# Patient Record
Sex: MALE | Race: WHITE | NOT HISPANIC OR LATINO | Employment: OTHER | URBAN - METROPOLITAN AREA
[De-identification: names, ages, dates, MRNs, and addresses within clinical notes are randomized per-mention and may not be internally consistent; named-entity substitution may affect disease eponyms.]

---

## 2024-09-28 ENCOUNTER — APPOINTMENT (OUTPATIENT)
Dept: RADIOLOGY | Facility: MEDICAL CENTER | Age: 69
End: 2024-09-28
Attending: EMERGENCY MEDICINE
Payer: MEDICARE

## 2024-09-28 ENCOUNTER — HOSPITAL ENCOUNTER (OUTPATIENT)
Facility: MEDICAL CENTER | Age: 69
End: 2024-09-29
Attending: EMERGENCY MEDICINE | Admitting: INTERNAL MEDICINE
Payer: MEDICARE

## 2024-09-28 DIAGNOSIS — R07.9 CHEST PAIN, UNSPECIFIED TYPE: ICD-10-CM

## 2024-09-28 DIAGNOSIS — I27.20 PULMONARY HYPERTENSION (HCC): ICD-10-CM

## 2024-09-28 DIAGNOSIS — I10 ESSENTIAL HYPERTENSION: ICD-10-CM

## 2024-09-28 DIAGNOSIS — E03.9 HYPOTHYROIDISM, UNSPECIFIED TYPE: ICD-10-CM

## 2024-09-28 DIAGNOSIS — R06.00 DYSPNEA, UNSPECIFIED TYPE: ICD-10-CM

## 2024-09-28 PROBLEM — F12.90 MARIJUANA USE: Status: ACTIVE | Noted: 2024-09-28

## 2024-09-28 PROBLEM — F15.10 METHAMPHETAMINE USE (HCC): Status: ACTIVE | Noted: 2024-09-28

## 2024-09-28 PROBLEM — R06.09 EXERTIONAL DYSPNEA: Status: ACTIVE | Noted: 2024-09-28

## 2024-09-28 LAB
ALBUMIN SERPL BCP-MCNC: 3.9 G/DL (ref 3.2–4.9)
ALBUMIN/GLOB SERPL: 1.2 G/DL
ALP SERPL-CCNC: 103 U/L (ref 30–99)
ALT SERPL-CCNC: 36 U/L (ref 2–50)
AMPHET UR QL SCN: POSITIVE
ANION GAP SERPL CALC-SCNC: 11 MMOL/L (ref 7–16)
APPEARANCE UR: CLEAR
AST SERPL-CCNC: 22 U/L (ref 12–45)
BARBITURATES UR QL SCN: NEGATIVE
BASOPHILS # BLD AUTO: 0.4 % (ref 0–1.8)
BASOPHILS # BLD: 0.04 K/UL (ref 0–0.12)
BENZODIAZ UR QL SCN: NEGATIVE
BILIRUB SERPL-MCNC: 0.4 MG/DL (ref 0.1–1.5)
BILIRUB UR QL STRIP.AUTO: NEGATIVE
BUN SERPL-MCNC: 18 MG/DL (ref 8–22)
BZE UR QL SCN: NEGATIVE
CALCIUM ALBUM COR SERPL-MCNC: 10.2 MG/DL (ref 8.5–10.5)
CALCIUM SERPL-MCNC: 10.1 MG/DL (ref 8.5–10.5)
CANNABINOIDS UR QL SCN: NEGATIVE
CHLORIDE SERPL-SCNC: 105 MMOL/L (ref 96–112)
CO2 SERPL-SCNC: 24 MMOL/L (ref 20–33)
COLOR UR: YELLOW
CREAT SERPL-MCNC: 1.39 MG/DL (ref 0.5–1.4)
EKG IMPRESSION: NORMAL
EOSINOPHIL # BLD AUTO: 0.12 K/UL (ref 0–0.51)
EOSINOPHIL NFR BLD: 1.2 % (ref 0–6.9)
ERYTHROCYTE [DISTWIDTH] IN BLOOD BY AUTOMATED COUNT: 45.5 FL (ref 35.9–50)
FENTANYL UR QL: NEGATIVE
GFR SERPLBLD CREATININE-BSD FMLA CKD-EPI: 55 ML/MIN/1.73 M 2
GLOBULIN SER CALC-MCNC: 3.3 G/DL (ref 1.9–3.5)
GLUCOSE SERPL-MCNC: 90 MG/DL (ref 65–99)
GLUCOSE UR STRIP.AUTO-MCNC: NEGATIVE MG/DL
HCT VFR BLD AUTO: 56.9 % (ref 42–52)
HGB BLD-MCNC: 20.1 G/DL (ref 14–18)
IMM GRANULOCYTES # BLD AUTO: 0.04 K/UL (ref 0–0.11)
IMM GRANULOCYTES NFR BLD AUTO: 0.4 % (ref 0–0.9)
KETONES UR STRIP.AUTO-MCNC: NEGATIVE MG/DL
LEUKOCYTE ESTERASE UR QL STRIP.AUTO: NEGATIVE
LYMPHOCYTES # BLD AUTO: 2.04 K/UL (ref 1–4.8)
LYMPHOCYTES NFR BLD: 20.2 % (ref 22–41)
MCH RBC QN AUTO: 34 PG (ref 27–33)
MCHC RBC AUTO-ENTMCNC: 35.3 G/DL (ref 32.3–36.5)
MCV RBC AUTO: 96.1 FL (ref 81.4–97.8)
METHADONE UR QL SCN: NEGATIVE
MICRO URNS: NORMAL
MONOCYTES # BLD AUTO: 0.96 K/UL (ref 0–0.85)
MONOCYTES NFR BLD AUTO: 9.5 % (ref 0–13.4)
NEUTROPHILS # BLD AUTO: 6.89 K/UL (ref 1.82–7.42)
NEUTROPHILS NFR BLD: 68.3 % (ref 44–72)
NITRITE UR QL STRIP.AUTO: NEGATIVE
NRBC # BLD AUTO: 0 K/UL
NRBC BLD-RTO: 0 /100 WBC (ref 0–0.2)
NT-PROBNP SERPL IA-MCNC: 459 PG/ML (ref 0–125)
OPIATES UR QL SCN: NEGATIVE
OXYCODONE UR QL SCN: NEGATIVE
PCP UR QL SCN: NEGATIVE
PH UR STRIP.AUTO: 7.5 [PH] (ref 5–8)
PLATELET # BLD AUTO: 246 K/UL (ref 164–446)
PMV BLD AUTO: 9.2 FL (ref 9–12.9)
POTASSIUM SERPL-SCNC: 4.9 MMOL/L (ref 3.6–5.5)
PROPOXYPH UR QL SCN: NEGATIVE
PROT SERPL-MCNC: 7.2 G/DL (ref 6–8.2)
PROT UR QL STRIP: NEGATIVE MG/DL
RBC # BLD AUTO: 5.92 M/UL (ref 4.7–6.1)
RBC UR QL AUTO: NEGATIVE
SODIUM SERPL-SCNC: 140 MMOL/L (ref 135–145)
SP GR UR STRIP.AUTO: 1.02
TROPONIN T SERPL-MCNC: 10 NG/L (ref 6–19)
TROPONIN T SERPL-MCNC: 12 NG/L (ref 6–19)
UROBILINOGEN UR STRIP.AUTO-MCNC: 0.2 MG/DL
WBC # BLD AUTO: 10.1 K/UL (ref 4.8–10.8)

## 2024-09-28 PROCEDURE — 99223 1ST HOSP IP/OBS HIGH 75: CPT | Performed by: INTERNAL MEDICINE

## 2024-09-28 PROCEDURE — G0378 HOSPITAL OBSERVATION PER HR: HCPCS

## 2024-09-28 PROCEDURE — 80053 COMPREHEN METABOLIC PANEL: CPT

## 2024-09-28 PROCEDURE — 700111 HCHG RX REV CODE 636 W/ 250 OVERRIDE (IP): Performed by: NURSE PRACTITIONER

## 2024-09-28 PROCEDURE — 96372 THER/PROPH/DIAG INJ SC/IM: CPT

## 2024-09-28 PROCEDURE — 71045 X-RAY EXAM CHEST 1 VIEW: CPT

## 2024-09-28 PROCEDURE — 85025 COMPLETE CBC W/AUTO DIFF WBC: CPT

## 2024-09-28 PROCEDURE — 83880 ASSAY OF NATRIURETIC PEPTIDE: CPT

## 2024-09-28 PROCEDURE — 80307 DRUG TEST PRSMV CHEM ANLYZR: CPT

## 2024-09-28 PROCEDURE — 93005 ELECTROCARDIOGRAM TRACING: CPT

## 2024-09-28 PROCEDURE — 93005 ELECTROCARDIOGRAM TRACING: CPT | Performed by: EMERGENCY MEDICINE

## 2024-09-28 PROCEDURE — 81003 URINALYSIS AUTO W/O SCOPE: CPT

## 2024-09-28 PROCEDURE — A9270 NON-COVERED ITEM OR SERVICE: HCPCS | Performed by: EMERGENCY MEDICINE

## 2024-09-28 PROCEDURE — 700102 HCHG RX REV CODE 250 W/ 637 OVERRIDE(OP): Performed by: EMERGENCY MEDICINE

## 2024-09-28 PROCEDURE — 36415 COLL VENOUS BLD VENIPUNCTURE: CPT

## 2024-09-28 PROCEDURE — 99285 EMERGENCY DEPT VISIT HI MDM: CPT

## 2024-09-28 PROCEDURE — 84484 ASSAY OF TROPONIN QUANT: CPT

## 2024-09-28 RX ORDER — ASPIRIN 81 MG/1
81 TABLET ORAL DAILY
Status: DISCONTINUED | OUTPATIENT
Start: 2024-09-29 | End: 2024-09-29 | Stop reason: HOSPADM

## 2024-09-28 RX ORDER — HYDRALAZINE HYDROCHLORIDE 20 MG/ML
10 INJECTION INTRAMUSCULAR; INTRAVENOUS EVERY 6 HOURS PRN
Status: DISCONTINUED | OUTPATIENT
Start: 2024-09-28 | End: 2024-09-29 | Stop reason: HOSPADM

## 2024-09-28 RX ORDER — REGADENOSON 0.08 MG/ML
0.4 INJECTION, SOLUTION INTRAVENOUS
Status: DISCONTINUED | OUTPATIENT
Start: 2024-09-28 | End: 2024-09-29 | Stop reason: HOSPADM

## 2024-09-28 RX ORDER — ASPIRIN 81 MG/1
324 TABLET, CHEWABLE ORAL ONCE
Status: COMPLETED | OUTPATIENT
Start: 2024-09-28 | End: 2024-09-28

## 2024-09-28 RX ORDER — ASPIRIN 300 MG/1
300 SUPPOSITORY RECTAL ONCE
Status: COMPLETED | OUTPATIENT
Start: 2024-09-28 | End: 2024-09-28

## 2024-09-28 RX ORDER — HEPARIN SODIUM 5000 [USP'U]/ML
5000 INJECTION, SOLUTION INTRAVENOUS; SUBCUTANEOUS EVERY 8 HOURS
Status: DISCONTINUED | OUTPATIENT
Start: 2024-09-28 | End: 2024-09-29 | Stop reason: HOSPADM

## 2024-09-28 RX ORDER — AMINOPHYLLINE 25 MG/ML
100 INJECTION, SOLUTION INTRAVENOUS
Status: DISCONTINUED | OUTPATIENT
Start: 2024-09-28 | End: 2024-09-29 | Stop reason: HOSPADM

## 2024-09-28 RX ORDER — MORPHINE SULFATE 4 MG/ML
2 INJECTION INTRAVENOUS
Status: DISCONTINUED | OUTPATIENT
Start: 2024-09-28 | End: 2024-09-29 | Stop reason: HOSPADM

## 2024-09-28 RX ADMIN — ASPIRIN 324 MG: 81 TABLET, CHEWABLE ORAL at 15:07

## 2024-09-28 RX ADMIN — HEPARIN SODIUM 5000 UNITS: 5000 INJECTION, SOLUTION INTRAVENOUS; SUBCUTANEOUS at 17:32

## 2024-09-28 SDOH — ECONOMIC STABILITY: TRANSPORTATION INSECURITY
IN THE PAST 12 MONTHS, HAS LACK OF RELIABLE TRANSPORTATION KEPT YOU FROM MEDICAL APPOINTMENTS, MEETINGS, WORK OR FROM GETTING THINGS NEEDED FOR DAILY LIVING?: NO

## 2024-09-28 SDOH — ECONOMIC STABILITY: TRANSPORTATION INSECURITY
IN THE PAST 12 MONTHS, HAS THE LACK OF TRANSPORTATION KEPT YOU FROM MEDICAL APPOINTMENTS OR FROM GETTING MEDICATIONS?: NO

## 2024-09-28 ASSESSMENT — ENCOUNTER SYMPTOMS
DIAPHORESIS: 0
SORE THROAT: 0
DEPRESSION: 0
DIZZINESS: 0
SHORTNESS OF BREATH: 1
BACK PAIN: 0
ORTHOPNEA: 0
CONSTIPATION: 0
SPUTUM PRODUCTION: 0
FEVER: 0
DIARRHEA: 0
FLANK PAIN: 0
NERVOUS/ANXIOUS: 0
ABDOMINAL PAIN: 0
COUGH: 0
PALPITATIONS: 0
SINUS PAIN: 0
HEADACHES: 0
MYALGIAS: 0
WEIGHT LOSS: 0
NAUSEA: 0
WHEEZING: 0
CHILLS: 0
HEARTBURN: 0
VOMITING: 0

## 2024-09-28 ASSESSMENT — LIFESTYLE VARIABLES
TOTAL SCORE: 0
CONSUMPTION TOTAL: NEGATIVE
ALCOHOL_USE: YES
AVERAGE NUMBER OF DAYS PER WEEK YOU HAVE A DRINK CONTAINING ALCOHOL: 3
EVER FELT BAD OR GUILTY ABOUT YOUR DRINKING: NO
HAVE YOU EVER FELT YOU SHOULD CUT DOWN ON YOUR DRINKING: NO
HAVE PEOPLE ANNOYED YOU BY CRITICIZING YOUR DRINKING: NO
TOTAL SCORE: 0
TOTAL SCORE: 0
DOES PATIENT WANT TO STOP DRINKING: NO
EVER HAD A DRINK FIRST THING IN THE MORNING TO STEADY YOUR NERVES TO GET RID OF A HANGOVER: NO
HOW MANY TIMES IN THE PAST YEAR HAVE YOU HAD 5 OR MORE DRINKS IN A DAY: 0
ON A TYPICAL DAY WHEN YOU DRINK ALCOHOL HOW MANY DRINKS DO YOU HAVE: 2

## 2024-09-28 ASSESSMENT — COGNITIVE AND FUNCTIONAL STATUS - GENERAL
SUGGESTED CMS G CODE MODIFIER MOBILITY: CH
DAILY ACTIVITIY SCORE: 24
MOBILITY SCORE: 24
SUGGESTED CMS G CODE MODIFIER DAILY ACTIVITY: CH

## 2024-09-28 ASSESSMENT — PATIENT HEALTH QUESTIONNAIRE - PHQ9
4. FEELING TIRED OR HAVING LITTLE ENERGY: SEVERAL DAYS
2. FEELING DOWN, DEPRESSED, IRRITABLE, OR HOPELESS: MORE THAN HALF THE DAYS
6. FEELING BAD ABOUT YOURSELF - OR THAT YOU ARE A FAILURE OR HAVE LET YOURSELF OR YOUR FAMILY DOWN: SEVERAL DAYS
SUM OF ALL RESPONSES TO PHQ QUESTIONS 1-9: 10
3. TROUBLE FALLING OR STAYING ASLEEP OR SLEEPING TOO MUCH: SEVERAL DAYS
9. THOUGHTS THAT YOU WOULD BE BETTER OFF DEAD, OR OF HURTING YOURSELF: NOT AT ALL
8. MOVING OR SPEAKING SO SLOWLY THAT OTHER PEOPLE COULD HAVE NOTICED. OR THE OPPOSITE, BEING SO FIGETY OR RESTLESS THAT YOU HAVE BEEN MOVING AROUND A LOT MORE THAN USUAL: NOT AT ALL
1. LITTLE INTEREST OR PLEASURE IN DOING THINGS: MORE THAN HALF THE DAYS
7. TROUBLE CONCENTRATING ON THINGS, SUCH AS READING THE NEWSPAPER OR WATCHING TELEVISION: SEVERAL DAYS
SUM OF ALL RESPONSES TO PHQ9 QUESTIONS 1 AND 2: 4
5. POOR APPETITE OR OVEREATING: MORE THAN HALF THE DAYS

## 2024-09-28 ASSESSMENT — SOCIAL DETERMINANTS OF HEALTH (SDOH)
WITHIN THE LAST YEAR, HAVE YOU BEEN HUMILIATED OR EMOTIONALLY ABUSED IN OTHER WAYS BY YOUR PARTNER OR EX-PARTNER?: NO
WITHIN THE PAST 12 MONTHS, THE FOOD YOU BOUGHT JUST DIDN'T LAST AND YOU DIDN'T HAVE MONEY TO GET MORE: NEVER TRUE
IN THE PAST 12 MONTHS, HAS THE ELECTRIC, GAS, OIL, OR WATER COMPANY THREATENED TO SHUT OFF SERVICE IN YOUR HOME?: NO
WITHIN THE LAST YEAR, HAVE YOU BEEN KICKED, HIT, SLAPPED, OR OTHERWISE PHYSICALLY HURT BY YOUR PARTNER OR EX-PARTNER?: NO
WITHIN THE PAST 12 MONTHS, YOU WORRIED THAT YOUR FOOD WOULD RUN OUT BEFORE YOU GOT THE MONEY TO BUY MORE: NEVER TRUE
WITHIN THE LAST YEAR, HAVE TO BEEN RAPED OR FORCED TO HAVE ANY KIND OF SEXUAL ACTIVITY BY YOUR PARTNER OR EX-PARTNER?: NO
WITHIN THE LAST YEAR, HAVE YOU BEEN AFRAID OF YOUR PARTNER OR EX-PARTNER?: NO

## 2024-09-28 ASSESSMENT — HEART SCORE
RISK FACTORS: 1-2 RISK FACTORS
HISTORY: MODERATELY SUSPICIOUS
ECG: NORMAL
AGE: 65+
HEART SCORE: 4
TROPONIN: LESS THAN OR EQUAL TO NORMAL LIMIT

## 2024-09-28 ASSESSMENT — FIBROSIS 4 INDEX
FIB4 SCORE: 1
FIB4 SCORE: 1

## 2024-09-28 ASSESSMENT — PAIN DESCRIPTION - PAIN TYPE: TYPE: ACUTE PAIN

## 2024-09-28 NOTE — ASSESSMENT & PLAN NOTE
EKG without acute ST-T changes. Troponin normal. BNP mildly elevated. CXR negative  -Trend cardiac enzymes  -Stress test in AM if enzymes are negative  -Consult cardiology if enzymes rise or if EKG changes   -Echocardiogram  -Check A1C, TSH and lipid panel  -ASA 81  -IV morphine PRN ongoing chest pain

## 2024-09-28 NOTE — ED NOTES
Med rec updated and complete. Allergies reviewed.     Pt confirmed name and date of birth.      Pt denies taking medications.   No outpatient antibiotic use in last 30 days.  No anticoagulant or antiplatelet medications.        Pt would like to use  [facility] [phone]

## 2024-09-28 NOTE — ED NOTES
PIV established. Repeat troponin drawn and sent to lab.  Pt updated on POC. Pt verbalized understanding.

## 2024-09-28 NOTE — ED PROVIDER NOTES
"ED Provider Note    CHIEF COMPLAINT  Chief Complaint   Patient presents with    Chest Pain     Patient substernal chest pain that has been happening intermittently since he quit smoking in February. Patient reports he notices the pain mostly on exertion. Patient endorses intermittent SOB as well    Shortness of Breath     Patient reports \"I feel like my lungs cleared up when I stopped smoking, but I get winded much faster\"        HPI/ROS    Clarence Taveras is a 69 y.o. male who presents with shortness of breath.  The patient states that he quit smoking in February of last year.  He states since that time he has had exertional dyspnea as well as chest pressure.  He does have some associated diaphoresis but no nausea.  He states that his chest pain seems to go away at rest.  He does not have any pain or swelling to his lower extremities.  He has not seen a medical provider in many years and is unaware of any medical problems.  Not had any recent fevers nor cough.    PAST MEDICAL HISTORY       SURGICAL HISTORY  patient denies any surgical history    FAMILY HISTORY  History reviewed. No pertinent family history.    SOCIAL HISTORY  Social History     Tobacco Use    Smoking status: Former     Current packs/day: 0.00     Types: Cigarettes     Quit date: 2024     Years since quittin.6    Smokeless tobacco: Never   Substance and Sexual Activity    Alcohol use: Not Currently    Drug use: Never    Sexual activity: Not on file       CURRENT MEDICATIONS  Home Medications       Reviewed by Coretta Davies R.N. (Registered Nurse) on 24 at 1253  Med List Status: Partial     Medication Last Dose Status        Patient Sammy Taking any Medications                         Audit from Redirected Encounters    **Home medications have not yet been reviewed for this encounter**         ALLERGIES  No Known Allergies    PHYSICAL EXAM  VITAL SIGNS: BP (!) 146/97   Pulse 96   Temp 36.9 °C (98.4 °F) (Temporal)   Resp 18   Ht " "1.727 m (5' 8\")   Wt 82.1 kg (181 lb)   SpO2 95%   BMI 27.52 kg/m²    In general the patient does not appear toxic    HEENT unremarkable    Pulmonary the patient's lungs are symmetrically diminished throughout.  There is no wheezing, rhonchi, no rales    Cardiovascular S1-S2 with a regular rate and rhythm    GI abdomen is soft    Skin no rashes, pallor, no jaundice    Extremities no distal edema    Neurologic examination is grossly intact    EKG/LABS  Results for orders placed or performed during the hospital encounter of 09/28/24   CBC with Differential   Result Value Ref Range    WBC 10.1 4.8 - 10.8 K/uL    RBC 5.92 4.70 - 6.10 M/uL    Hemoglobin 20.1 (H) 14.0 - 18.0 g/dL    Hematocrit 56.9 (H) 42.0 - 52.0 %    MCV 96.1 81.4 - 97.8 fL    MCH 34.0 (H) 27.0 - 33.0 pg    MCHC 35.3 32.3 - 36.5 g/dL    RDW 45.5 35.9 - 50.0 fL    Platelet Count 246 164 - 446 K/uL    MPV 9.2 9.0 - 12.9 fL    Neutrophils-Polys 68.30 44.00 - 72.00 %    Lymphocytes 20.20 (L) 22.00 - 41.00 %    Monocytes 9.50 0.00 - 13.40 %    Eosinophils 1.20 0.00 - 6.90 %    Basophils 0.40 0.00 - 1.80 %    Immature Granulocytes 0.40 0.00 - 0.90 %    Nucleated RBC 0.00 0.00 - 0.20 /100 WBC    Neutrophils (Absolute) 6.89 1.82 - 7.42 K/uL    Lymphs (Absolute) 2.04 1.00 - 4.80 K/uL    Monos (Absolute) 0.96 (H) 0.00 - 0.85 K/uL    Eos (Absolute) 0.12 0.00 - 0.51 K/uL    Baso (Absolute) 0.04 0.00 - 0.12 K/uL    Immature Granulocytes (abs) 0.04 0.00 - 0.11 K/uL    NRBC (Absolute) 0.00 K/uL   Complete Metabolic Panel (CMP)   Result Value Ref Range    Sodium 140 135 - 145 mmol/L    Potassium 4.9 3.6 - 5.5 mmol/L    Chloride 105 96 - 112 mmol/L    Co2 24 20 - 33 mmol/L    Anion Gap 11.0 7.0 - 16.0    Glucose 90 65 - 99 mg/dL    Bun 18 8 - 22 mg/dL    Creatinine 1.39 0.50 - 1.40 mg/dL    Calcium 10.1 8.5 - 10.5 mg/dL    Correct Calcium 10.2 8.5 - 10.5 mg/dL    AST(SGOT) 22 12 - 45 U/L    ALT(SGPT) 36 2 - 50 U/L    Alkaline Phosphatase 103 (H) 30 - 99 U/L    Total " Bilirubin 0.4 0.1 - 1.5 mg/dL    Albumin 3.9 3.2 - 4.9 g/dL    Total Protein 7.2 6.0 - 8.2 g/dL    Globulin 3.3 1.9 - 3.5 g/dL    A-G Ratio 1.2 g/dL   proBrain Natriuretic Peptide, NT (BNP)   Result Value Ref Range    NT-proBNP 459 (H) 0 - 125 pg/mL   Troponins NOW   Result Value Ref Range    Troponin T 12 6 - 19 ng/L   ESTIMATED GFR   Result Value Ref Range    GFR (CKD-EPI) 55 (A) >60 mL/min/1.73 m 2   EKG (NOW)   Result Value Ref Range    Report       [facility] Emergency [facility]    Test Date:  2024  Pt Name:    MYRON ZUNIGA                  Department: ER  MRN:        [id number]                      Room:  Gender:     Male                         Technician: [id number]  :        [id number]                   Requested By:ER TRIAGE PROTOCOL  Order #:    [id number]                    Reading MD: LETTY ZAMORA MD    Measurements  Intervals                                Axis  Rate:       109                          P:          68  MA:         138                          QRS:        90  QRSD:       79                           T:          56  QT:         318  QTc:        429    Interpretive Statements  Twelve-lead EKG shows a sinus tachycardia with a ventricular rate of 109, the  patient does have a premature ventricular contraction, no ST segment  elevation or depression, T wave inverted in V1.  There is no old EKG to  compare  Electronically Signed On 2024 14:51:23 PDT by LETTY ZAMORA MD         I have independently interpreted this EKG    RADIOLOGY/PROCEDURES   I have independently interpreted the diagnostic imaging associated with this visit and am waiting the final reading from the radiologist.   My preliminary interpretation is as follows: Chest x-ray is reviewed and shows no evidence of a focal process such as pneumonia    Radiologist interpretation:  DX-CHEST-PORTABLE (1 VIEW)   Final Result         No acute cardiac or pulmonary abnormality is identified.        HEART Score:  4    COURSE & MEDICAL DECISION MAKING    This is 69-year-old male who presents to the Emergency Department with chest discomfort.  Patient's history is concerning for unstable angina with the exertional component.  He is pain-free at this time.  He will receive aspirin.  His BNP is slightly elevated but clinical does not have any evidence of heart failure.  He could also have COPD from his prior smoking as his lungs are slightly diminished.  Due to the inability to rule out a cardiac source admit the patient to the CDU overnight for stress test.  Otherwise the patient is resting comfortably at the time of admission.    FINAL DIAGNOSIS  1.  Chest pain  2.  Dyspnea    Disposition  The patient will be admitted in stable condition     Electronically signed by: Med Gong M.D., 9/28/2024 2:51 PM

## 2024-09-28 NOTE — ED NOTES
Pt ambulatory from lobby to ED room with steady gait.  Pt changed into gown and connected to monitor.  Call light within reach.

## 2024-09-28 NOTE — ED TRIAGE NOTES
"Chief Complaint   Patient presents with    Chest Pain     Patient substernal chest pain that has been happening intermittently since he quit smoking in February. Patient reports he notices the pain mostly on exertion. Patient endorses intermittent SOB as well    Shortness of Breath     Patient reports \"I feel like my lungs cleared up when I stopped smoking, but I get winded much faster\"        70 yo male to triage for above complaint.   EKG complete. Protocol ordered.     Pt is alert and oriented, speaking in full sentences, follows commands and responds appropriately to questions.     Patient placed back in lobby and educated on triage process. Asked to inform RN of any changes.    /80   Pulse (!) 115   Temp 36.9 °C (98.4 °F) (Temporal)   Resp 18   Ht 1.727 m (5' 8\")   Wt 82.1 kg (181 lb)   SpO2 98%   BMI 27.52 kg/m²     "

## 2024-09-28 NOTE — ED NOTES
Report called to receiving tele 8 RN.  Per RN, room is not cleaned yet. Tele to come down to  pt once room is clean.

## 2024-09-28 NOTE — H&P
"Hospital Medicine History & Physical Note    Date of Service  9/28/2024    Primary Care Physician  No primary care provider on file.      Code Status  Full Code    Chief Complaint  Chief Complaint   Patient presents with    Chest Pain     Patient substernal chest pain that has been happening intermittently since he quit smoking in February. Patient reports he notices the pain mostly on exertion. Patient endorses intermittent SOB as well    Shortness of Breath     Patient reports \"I feel like my lungs cleared up when I stopped smoking, but I get winded much faster\"        History of Presenting Illness  Clarence Taveras is a 69 y.o. male who presented to the ED on 9/28/2024 with complaints of exertional shortness of breath. The patient tells me that he stopped smoking in February, 2024. He says he smoked a pack cigarettes daily for many years. Since he quit smoking, he has had shortness of breath mainly when he bends over or carries heavy objects. He occasionally has shortness of breath with exertion. He denies associated chest pain, dizziness, lightheadedness, diaphoresis nausea or palpitations. The patient denies cold or flulike illnesses, denies history of lung disease. He has never been diagnosed with COPD. He reports very occasional use of marijuana. He says he uses small doses of methamphetamine, last used four days ago. Denies alcohol use. He denies a family history of lung disease, cancer, diabetes or heart disease.    CXR is negative for acute findings. EKG without a acute ST - T changes or arrhythmia. CBC is unremarkable. Patient has mild decrease in GFR 55. BNP is somewhat elevated at 459. Troponin x2 are normal.    I discussed the plan of care with patient and ERP .    Review of Systems  Review of Systems   Constitutional:  Negative for chills, diaphoresis, fever, malaise/fatigue and weight loss.   HENT:  Negative for congestion, sinus pain and sore throat.    Respiratory:  Positive for shortness of " breath. Negative for cough, sputum production and wheezing.    Cardiovascular:  Negative for chest pain, palpitations, orthopnea and leg swelling.   Gastrointestinal:  Negative for abdominal pain, constipation, diarrhea, heartburn, nausea and vomiting.   Genitourinary:  Negative for dysuria, flank pain, frequency and urgency.   Musculoskeletal:  Negative for back pain and myalgias.   Skin:  Negative for itching and rash.   Neurological:  Negative for dizziness and headaches.   Psychiatric/Behavioral:  Negative for depression. The patient is not nervous/anxious.        Past Medical History  history of tobacco abuse, quit smoking in February, 2024. Occasional marijuana use. Methamphetamine use    Surgical History  no history of surgeries    Family History  denies family history of diabetes, heart disease or cancer  Family history reviewed with patient. There is no family history that is pertinent to the chief complaint.     Social History   reports that he quit smoking about 7 months ago. His smoking use included cigarettes. He has never used smokeless tobacco. He reports that he does not currently use alcohol. He reports that he does not use drugs.    Allergies  No Known Allergies    Medications  None       Physical Exam  Temp:  [36.7 °C (98.1 °F)-36.9 °C (98.4 °F)] 36.7 °C (98.1 °F)  Pulse:  [] 93  Resp:  [16-22] 16  BP: (134-172)/() 141/99  SpO2:  [93 %-98 %] 94 %  Blood Pressure : (!) 139/97   Temperature: 36.9 °C (98.4 °F)   Pulse: 95   Respiration: (!) 22   Pulse Oximetry: 93 %       Physical Exam  Vitals and nursing note reviewed.   Constitutional:       Appearance: Normal appearance.   HENT:      Head: Normocephalic and atraumatic.      Nose: Nose normal. No congestion or rhinorrhea.      Mouth/Throat:      Pharynx: Oropharynx is clear.   Eyes:      Extraocular Movements: Extraocular movements intact.      Pupils: Pupils are equal, round, and reactive to light.   Cardiovascular:      Rate and  Rhythm: Normal rate and regular rhythm.      Pulses: Normal pulses.      Heart sounds: Normal heart sounds. No murmur heard.  Pulmonary:      Effort: Pulmonary effort is normal. No respiratory distress.      Breath sounds: Normal breath sounds. No wheezing, rhonchi or rales.   Abdominal:      General: Bowel sounds are normal. There is no distension.      Tenderness: There is no abdominal tenderness.   Musculoskeletal:         General: Normal range of motion.      Cervical back: Normal range of motion.      Right lower leg: No edema.      Left lower leg: No edema.   Skin:     General: Skin is warm and dry.      Capillary Refill: Capillary refill takes less than 2 seconds.      Findings: No lesion or rash.   Neurological:      Mental Status: He is alert and oriented to person, place, and time.   Psychiatric:         Mood and Affect: Mood normal.         Behavior: Behavior normal.         Laboratory:  Recent Labs     09/28/24  1258   WBC 10.1   RBC 5.92   HEMOGLOBIN 20.1*   HEMATOCRIT 56.9*   MCV 96.1   MCH 34.0*   MCHC 35.3   RDW 45.5   PLATELETCT 246   MPV 9.2     Recent Labs     09/28/24  1258   SODIUM 140   POTASSIUM 4.9   CHLORIDE 105   CO2 24   GLUCOSE 90   BUN 18   CREATININE 1.39   CALCIUM 10.1     Recent Labs     09/28/24  1258   ALTSGPT 36   ASTSGOT 22   ALKPHOSPHAT 103*   TBILIRUBIN 0.4   GLUCOSE 90         Recent Labs     09/28/24  1258   NTPROBNP 459*         Recent Labs     09/28/24  1258 09/28/24  1514   TROPONINT 12 10       Imaging:  DX-CHEST-PORTABLE (1 VIEW)   Final Result         No acute cardiac or pulmonary abnormality is identified.      NM-CARDIAC STRESS TEST    (Results Pending)   EC-ECHOCARDIOGRAM COMPLETE W/O CONT    (Results Pending)       EKG 2/28/24  Measurements   Intervals                               Axis   Rate:       109                          P:          68   W[place]:         138                          QRS:        90   QRSD:       79                           T:          56   QT:          318   QTc:        429   Interpretive Statements   Twelve-lead EKG shows a sinus tachycardia with a ventricular rate of 109, the   patient does have a premature ventricular contraction, no ST segment   elevation or depression, T wave inverted in V1.       ASSESSMENT/PLAN:  * Exertional dyspnea- (present on admission)  Assessment & Plan  EKG without acute ST-T changes. Troponin normal. BNP mildly elevated. CXR negative  -Trend cardiac enzymes  -Stress test in AM if enzymes are negative  -Consult cardiology if enzymes rise or if EKG changes   -Echocardiogram  -Check A1C, TSH and lipid panel  -ASA 81  -IV morphine PRN ongoing chest pain    Methamphetamine use (HCC)- (present on admission)  Assessment & Plan  Patient last used four days ago. He says he was small amount of methamphetamine  - UDS   -encouraged cessation    Marijuana use- (present on admission)  Assessment & Plan  Does he really uses marijuana  -encouraged cessation    Code status: full code  DVT prophylaxis: STDs, heparin    Justification for Admission Status  I anticipate this patient is appropriate for observation status at this time because patient requires further workup for ACS, including echo and stress test    Patient will need a Telemetry bed on MEDICAL service. The need is secondary to further evaluation of ACS.    VTE prophylaxis: SCDs/TEDs and heparin ppx

## 2024-09-29 ENCOUNTER — PHARMACY VISIT (OUTPATIENT)
Dept: PHARMACY | Facility: MEDICAL CENTER | Age: 69
End: 2024-09-29
Payer: COMMERCIAL

## 2024-09-29 ENCOUNTER — APPOINTMENT (OUTPATIENT)
Dept: CARDIOLOGY | Facility: MEDICAL CENTER | Age: 69
End: 2024-09-29
Attending: NURSE PRACTITIONER
Payer: MEDICARE

## 2024-09-29 VITALS
WEIGHT: 180.12 LBS | TEMPERATURE: 97.8 F | OXYGEN SATURATION: 92 % | HEART RATE: 96 BPM | DIASTOLIC BLOOD PRESSURE: 80 MMHG | HEIGHT: 68 IN | SYSTOLIC BLOOD PRESSURE: 121 MMHG | RESPIRATION RATE: 16 BRPM | BODY MASS INDEX: 27.3 KG/M2

## 2024-09-29 PROBLEM — E03.9 HYPOTHYROIDISM: Status: ACTIVE | Noted: 2024-09-29

## 2024-09-29 PROBLEM — E11.9 TYPE 2 DIABETES MELLITUS (HCC): Status: ACTIVE | Noted: 2024-09-29

## 2024-09-29 PROBLEM — I27.20 PULMONARY HYPERTENSION (HCC): Status: ACTIVE | Noted: 2024-09-28

## 2024-09-29 PROBLEM — F15.10 METHAMPHETAMINE USE (HCC): Chronic | Status: ACTIVE | Noted: 2024-09-28

## 2024-09-29 PROBLEM — I10 ESSENTIAL HYPERTENSION: Status: ACTIVE | Noted: 2024-09-29

## 2024-09-29 LAB
CHOLEST SERPL-MCNC: 187 MG/DL (ref 100–199)
ERYTHROCYTE [DISTWIDTH] IN BLOOD BY AUTOMATED COUNT: 45.3 FL (ref 35.9–50)
EST. AVERAGE GLUCOSE BLD GHB EST-MCNC: 140 MG/DL
HBA1C MFR BLD: 6.5 % (ref 4–5.6)
HCT VFR BLD AUTO: 55.4 % (ref 42–52)
HDLC SERPL-MCNC: 42 MG/DL
HGB BLD-MCNC: 19.2 G/DL (ref 14–18)
LDLC SERPL CALC-MCNC: 116 MG/DL
LV EJECT FRACT  99904: 58
LV EJECT FRACT MOD 2C 99903: 53.98
LV EJECT FRACT MOD 4C 99902: 54.5
LV EJECT FRACT MOD BP 99901: 57.7
MAGNESIUM SERPL-MCNC: 1.9 MG/DL (ref 1.5–2.5)
MCH RBC QN AUTO: 32.9 PG (ref 27–33)
MCHC RBC AUTO-ENTMCNC: 34.7 G/DL (ref 32.3–36.5)
MCV RBC AUTO: 95 FL (ref 81.4–97.8)
PLATELET # BLD AUTO: 238 K/UL (ref 164–446)
PMV BLD AUTO: 9.4 FL (ref 9–12.9)
RBC # BLD AUTO: 5.83 M/UL (ref 4.7–6.1)
TRIGL SERPL-MCNC: 146 MG/DL (ref 0–149)
TSH SERPL-ACNC: 8.61 UIU/ML (ref 0.35–5.5)
WBC # BLD AUTO: 10.3 K/UL (ref 4.8–10.8)

## 2024-09-29 PROCEDURE — 83735 ASSAY OF MAGNESIUM: CPT

## 2024-09-29 PROCEDURE — 99222 1ST HOSP IP/OBS MODERATE 55: CPT | Performed by: INTERNAL MEDICINE

## 2024-09-29 PROCEDURE — 93005 ELECTROCARDIOGRAM TRACING: CPT | Performed by: NURSE PRACTITIONER

## 2024-09-29 PROCEDURE — 85027 COMPLETE CBC AUTOMATED: CPT

## 2024-09-29 PROCEDURE — 700102 HCHG RX REV CODE 250 W/ 637 OVERRIDE(OP): Performed by: INTERNAL MEDICINE

## 2024-09-29 PROCEDURE — 80061 LIPID PANEL: CPT

## 2024-09-29 PROCEDURE — RXMED WILLOW AMBULATORY MEDICATION CHARGE: Performed by: INTERNAL MEDICINE

## 2024-09-29 PROCEDURE — G0378 HOSPITAL OBSERVATION PER HR: HCPCS

## 2024-09-29 PROCEDURE — 99239 HOSP IP/OBS DSCHRG MGMT >30: CPT | Performed by: INTERNAL MEDICINE

## 2024-09-29 PROCEDURE — 93306 TTE W/DOPPLER COMPLETE: CPT | Mod: 26 | Performed by: INTERNAL MEDICINE

## 2024-09-29 PROCEDURE — 700102 HCHG RX REV CODE 250 W/ 637 OVERRIDE(OP): Performed by: NURSE PRACTITIONER

## 2024-09-29 PROCEDURE — 99204 OFFICE O/P NEW MOD 45 MIN: CPT | Performed by: INTERNAL MEDICINE

## 2024-09-29 PROCEDURE — A9270 NON-COVERED ITEM OR SERVICE: HCPCS | Performed by: NURSE PRACTITIONER

## 2024-09-29 PROCEDURE — 84443 ASSAY THYROID STIM HORMONE: CPT

## 2024-09-29 PROCEDURE — 96372 THER/PROPH/DIAG INJ SC/IM: CPT

## 2024-09-29 PROCEDURE — 93306 TTE W/DOPPLER COMPLETE: CPT

## 2024-09-29 PROCEDURE — 700111 HCHG RX REV CODE 636 W/ 250 OVERRIDE (IP): Performed by: NURSE PRACTITIONER

## 2024-09-29 PROCEDURE — A9270 NON-COVERED ITEM OR SERVICE: HCPCS | Performed by: INTERNAL MEDICINE

## 2024-09-29 PROCEDURE — 83036 HEMOGLOBIN GLYCOSYLATED A1C: CPT

## 2024-09-29 RX ORDER — LEVOTHYROXINE SODIUM 25 UG/1
25 TABLET ORAL
Status: DISCONTINUED | OUTPATIENT
Start: 2024-09-29 | End: 2024-09-29 | Stop reason: HOSPADM

## 2024-09-29 RX ORDER — OLMESARTAN MEDOXOMIL 20 MG/1
20 TABLET ORAL
Status: DISCONTINUED | OUTPATIENT
Start: 2024-09-29 | End: 2024-09-29 | Stop reason: HOSPADM

## 2024-09-29 RX ORDER — LEVOTHYROXINE SODIUM 25 UG/1
25 TABLET ORAL
Qty: 30 TABLET | Refills: 0 | Status: SHIPPED | OUTPATIENT
Start: 2024-09-30

## 2024-09-29 RX ORDER — OLMESARTAN MEDOXOMIL 20 MG/1
20 TABLET ORAL DAILY
Qty: 30 TABLET | Refills: 0 | Status: SHIPPED | OUTPATIENT
Start: 2024-09-30

## 2024-09-29 RX ORDER — ACETAMINOPHEN 325 MG/1
650 TABLET ORAL EVERY 4 HOURS PRN
Status: DISCONTINUED | OUTPATIENT
Start: 2024-09-29 | End: 2024-09-29 | Stop reason: HOSPADM

## 2024-09-29 RX ORDER — ACETAMINOPHEN 650 MG/1
650 SUPPOSITORY RECTAL EVERY 4 HOURS PRN
Status: DISCONTINUED | OUTPATIENT
Start: 2024-09-29 | End: 2024-09-29 | Stop reason: HOSPADM

## 2024-09-29 RX ADMIN — LEVOTHYROXINE SODIUM 25 MCG: 0.03 TABLET ORAL at 06:40

## 2024-09-29 RX ADMIN — ACETAMINOPHEN 650 MG: 325 TABLET ORAL at 08:10

## 2024-09-29 RX ADMIN — HEPARIN SODIUM 5000 UNITS: 5000 INJECTION, SOLUTION INTRAVENOUS; SUBCUTANEOUS at 01:39

## 2024-09-29 RX ADMIN — OLMESARTAN MEDOXOMIL 20 MG: 20 TABLET, COATED ORAL at 09:35

## 2024-09-29 RX ADMIN — ASPIRIN 81 MG: 81 TABLET, COATED ORAL at 05:37

## 2024-09-29 RX ADMIN — HEPARIN SODIUM 5000 UNITS: 5000 INJECTION, SOLUTION INTRAVENOUS; SUBCUTANEOUS at 09:35

## 2024-09-29 ASSESSMENT — ENCOUNTER SYMPTOMS: SHORTNESS OF BREATH: 1

## 2024-09-29 NOTE — PROGRESS NOTES
Bedside shift report received from HU Lin. Patient A&Ox4, in bed resting comfortably. Pt denies pain. Bed in lowest, locked position with call light and belongings within reach. Fall precautions reviewed with patient. Patient updated on plan of care.

## 2024-09-29 NOTE — PROGRESS NOTES
4 Eyes Skin Assessment Completed by 2 RN     Head WDL  Ears WDL  Nose WDL  Mouth WDL  Neck WDL  Breast/Chest WDL  Shoulder Blades WDL  Spine WDL  (R) Arm/Elbow/Hand  WNL  (L) Arm/Elbow-  Abdomen WDL  Groin WDL  Scrotum/Coccyx/Buttocks WDL  (R) Leg groin mild redness&itching  (L) Leg WDL  (R) Heel/Foot/Toe WDL  (L) Heel/Foot/Toe WDL   Devices In Place : Pulse Ox  Interventions In Place Pillows  Possible Skin Injury No   Pictures Uploaded Into Epic N/A  Wound Consult Placed N/A  RN Wound Prevention Protocol Ordered No

## 2024-09-29 NOTE — PROGRESS NOTES
Bedside report received from HU Chatman. Pt's bed is in lowest position, locked, and call light within reach. Pt denies any needs at this time.

## 2024-09-29 NOTE — PROGRESS NOTES
Received pt from TELE on arrival pt awake,a&ox4,no c/o chest pain now,for stress test tomorrow,npo status explained to ptNeed for urine for UA explained.

## 2024-09-29 NOTE — DISCHARGE INSTRUCTIONS
Discharge Instructions    Discharged to home by car with relative. Discharged via wheelchair, hospital escort: Yes.  Special equipment needed: Not Applicable    Be sure to schedule a follow-up appointment with your primary care doctor or any specialists as instructed.     Discharge Plan:   Diet Plan: Discussed  Activity Level: Discussed  Confirmed Follow up Appointment: Patient to Call and Schedule Appointment  Confirmed Symptoms Management: Discussed  Medication Reconciliation Updated: Yes  Influenza Vaccine Indication: Patient Refuses    I understand that a diet low in cholesterol, fat, and sodium is recommended for good health. Unless I have been given specific instructions below for another diet, I accept this instruction as my diet prescription.   Other diet:diabetic diet    Special Instructions: None    -Is this patient being discharged with medication to prevent blood clots?  No    Is patient discharged on Warfarin / Coumadin?   No

## 2024-09-29 NOTE — PROGRESS NOTES
Pt in bed awake,a&ox4,explained to pt stress test is on hold per MD,pt for echo,no c/o chest pain,poc explained to pt.

## 2024-09-29 NOTE — CARE PLAN
The patient is Stable - Low risk of patient condition declining or worsening    Shift Goals  Clinical Goals: stress test, echo, trend trops  Patient Goals: feel better, complete ADLs  Family Goals: VANNA    Problem: Respiratory  Goal: Patient will achieve/maintain optimum respiratory ventilation and gas exchange  Description: Target End Date: 9/30/24  1.  Assess and monitor rate, rhythm, depth and effort of respiration  2.  Breath sounds assessed qshift and/or as needed  3.  Assess O2 saturation, administer/titrate oxygen as ordered  4. Alternate physical activity with rest periods  Outcome: Progressing     Problem: Hemodynamics  Goal: Patient's hemodynamics, fluid balance and neurologic status will be stable or improve  Description: Target End Date:  9/30/24    1.  Monitor vital signs, pulse oximetry and cardiac monitor per provider order and/or policy  2.  Maintain blood pressure per provider order  3. Assess mental status, restlessness and changes in level of consciousness  Outcome: Progressing       Problem: Knowledge Deficit - Standard  Goal: Patient will demonstrate understanding of plan of care, disease process/condition, diagnostic tests and medications  Description: Target End Date:  9/30/24    1.  Patient oriented to unit, equipment, visitation policy and means for communicating concern  2.  Complete/review Learning Assessment  3.  Assess knowledge level of disease process/condition, treatment plan, diagnostic tests and medications  4.  Explain disease process/condition, treatment plan, diagnostic tests and medications  Outcome: Progressing

## 2024-09-29 NOTE — PROGRESS NOTES
Pt states she saw  last month and was prescribed medication but was told her medication wasn't changing. When she went to her pharmacy she was told the medication prescribed is different and she would like to confirmed if the medication changed or stayed the same.   Received report from ED RN. Pt transported to floor with zoll. Pt currently A&Ox4, vitals stable, on room air. No complaints of chest pain or pressure.

## 2024-09-29 NOTE — CONSULTS
"Reason for Consult:  Asked by Dr Sunil Diaz M.D. to see this patient with pulmonary hypertension asked to arrange for right heart cath  Patient's PCP: Pcp Pt States None    CC:   Chief Complaint   Patient presents with    Chest Pain     Patient substernal chest pain that has been happening intermittently since he quit smoking in February. Patient reports he notices the pain mostly on exertion. Patient endorses intermittent SOB as well    Shortness of Breath     Patient reports \"I feel like my lungs cleared up when I stopped smoking, but I get winded much faster\"        HPI: This is a 69-year-old gentleman here with chest pain shortness of breath he is found to be positive for methamphetamines with long-term methamphetamine use echocardiogram shows pulm hypertension he was seen by pulmonary and recommend right heart catheterization    Medications / Drug list prior to admission:  No current facility-administered medications on file prior to encounter.     No current outpatient medications on file prior to encounter.       Current list of administered Medications:    Current Facility-Administered Medications:     levothyroxine (Synthroid) tablet 25 mcg, 25 mcg, Oral, AM ES, Sunil Diaz M.D., 25 mcg at 09/29/24 0640    olmesartan (Benicar) tablet 20 mg, 20 mg, Oral, Q DAY, Sunil Diaz M.D., 20 mg at 09/29/24 0935    acetaminophen (Tylenol) tablet 650 mg, 650 mg, Oral, Q4HRS PRN, 650 mg at 09/29/24 0810 **OR** acetaminophen (Tylenol) suppository 650 mg, 650 mg, Rectal, Q4HRS PRN, Sunil Diaz M.D.    aspirin EC tablet 81 mg, 81 mg, Oral, DAILY, Carol Ann Shaver, A.P.R.N., 81 mg at 09/29/24 0537    morphine 4 MG/ML injection 2 mg, 2 mg, Intravenous, Q3HRS PRN, Carol Ann Shaver, A.P.R.N.    regadenoson (Lexiscan) injection SOLN 0.4 mg, 0.4 mg, Intravenous, Once PRN, Carol Ann Shaver, A.P.R.N.    aminophylline injection 100 mg, 100 mg, Intravenous, Q5 MIN PRN, Carol Ann Shaver, A.P.RAletaN.    heparin injection " "5,000 Units, 5,000 Units, Subcutaneous, Q8HRS, Carol Ann Shaver, A.P.R.N., 5,000 Units at 24 0935    hydrALAZINE (Apresoline) injection 10 mg, 10 mg, Intravenous, Q6HRS PRN, Carol Ann Shaver A.P.R.N.    Past Medical History:   Diagnosis Date    Essential hypertension 2024    Hypothyroidism 2024    Marijuana use 2024    Methamphetamine use (HCC) 2024    Type 2 diabetes mellitus (HCC) 2024         Patient family history was personally reviewed, no pertinent family history to current presentation    Social History     Tobacco Use    Smoking status: Former     Current packs/day: 0.00     Types: Cigarettes     Quit date: 2024     Years since quittin.6    Smokeless tobacco: Never   Substance Use Topics    Alcohol use: Not Currently    Drug use: Never       ALLERGIES:  No Known Allergies    Review of systems:  A complete review of symptoms was reviewed with patient. This is reviewed in H&P and PMH. ALL OTHERS reviewed and negative    Physical exam:  Patient Vitals for the past 24 hrs:   BP Temp Temp src Pulse Resp SpO2 Height Weight   24 1134 121/80 36.6 °C (97.8 °F) Temporal 96 16 92 % -- --   24 0723 125/78 36.6 °C (97.8 °F) Temporal 84 16 93 % -- --   24 0417 (!) 152/114 36.7 °C (98 °F) Temporal 99 16 94 % -- --   24 2336 (!) 127/93 36.7 °C (98 °F) Temporal 85 16 94 % -- --   24 2236 -- -- -- -- -- -- 1.727 m (5' 8\") 81.7 kg (180 lb 1.9 oz)   24 1937 (!) 135/91 36.8 °C (98.2 °F) Temporal 91 17 94 % -- --   24 1811 (!) 137/95 36.8 °C (98.3 °F) Temporal 97 18 92 % -- --   24 1734 (!) 141/99 -- -- -- -- -- -- --   24 1636 (!) 163/105 36.7 °C (98.1 °F) Temporal 93 16 94 % -- 81.5 kg (179 lb 10.8 oz)   24 1600 (!) 172/107 -- -- 95 18 93 % -- --   24 1508 (!) 139/97 -- -- 95 (!) 22 93 % -- --     General: No acute distress.   EYES: no jaundice  HEENT: OP clear   Neck:  No JVD.   CVS:  [ RRR.   Resp: Normal respiratory " effort,   Abdomen: ND,  Skin: Grossly nothing acute no obvious rashes  Neurological: Alert, Moves all extremities  Extremities:   [   no edema. No cyanosis.       Data:  Laboratory studies personally reviewed by me:  Recent Results (from the past 24 hour(s))   Troponins in two (2) hours    Collection Time: 09/28/24  3:14 PM   Result Value Ref Range    Troponin T 10 6 - 19 ng/L   URINALYSIS    Collection Time: 09/28/24  9:49 PM    Specimen: Urine, Clean Catch   Result Value Ref Range    Color Yellow     Character Clear     Specific Gravity 1.018 <1.035    Ph 7.5 5.0 - 8.0    Glucose Negative Negative mg/dL    Ketones Negative Negative mg/dL    Protein Negative Negative mg/dL    Bilirubin Negative Negative    Urobilinogen, Urine 0.2 Negative    Nitrite Negative Negative    Leukocyte Esterase Negative Negative    Occult Blood Negative Negative    Micro Urine Req see below    URINE DRUG SCREEN    Collection Time: 09/28/24  9:49 PM   Result Value Ref Range    Amphetamines Urine Positive (A) Negative    Barbiturates Negative Negative    Benzodiazepines Negative Negative    Cocaine Metabolite Negative Negative    Fentanyl, Urine Negative Negative    Methadone Negative Negative    Opiates Negative Negative    Oxycodone Negative Negative    Phencyclidine -Pcp Negative Negative    Propoxyphene Negative Negative    Cannabinoid Metab Negative Negative   Lipid Profile (Tomorrow AM)    Collection Time: 09/29/24 12:12 AM   Result Value Ref Range    Cholesterol,Tot 187 100 - 199 mg/dL    Triglycerides 146 0 - 149 mg/dL    HDL 42 >=40 mg/dL     (H) <100 mg/dL   CBC without Differential    Collection Time: 09/29/24 12:12 AM   Result Value Ref Range    WBC 10.3 4.8 - 10.8 K/uL    RBC 5.83 4.70 - 6.10 M/uL    Hemoglobin 19.2 (H) 14.0 - 18.0 g/dL    Hematocrit 55.4 (H) 42.0 - 52.0 %    MCV 95.0 81.4 - 97.8 fL    MCH 32.9 27.0 - 33.0 pg    MCHC 34.7 32.3 - 36.5 g/dL    RDW 45.3 35.9 - 50.0 fL    Platelet Count 238 164 - 446 K/uL     MPV 9.4 9.0 - 12.9 fL   Magnesium    Collection Time: 24 12:12 AM   Result Value Ref Range    Magnesium 1.9 1.5 - 2.5 mg/dL   TSH    Collection Time: 24 12:12 AM   Result Value Ref Range    TSH 8.610 (H) 0.350 - 5.500 uIU/mL   HEMOGLOBIN A1C    Collection Time: 24 12:12 AM   Result Value Ref Range    Glycohemoglobin 6.5 (H) 4.0 - 5.6 %    Est Avg Glucose 140 mg/dL   EKG    Collection Time: 24  7:12 AM   Result Value Ref Range    Report       [facility]    Test Date:  2024  Pt Name:    MYRON ZUNIGA                  Department: CPU  MRN:        [id number]                      Room:       T203  Gender:     Male                         Technician: JOHN  :        1955                   Requested By:ANA WALLACE  Order #:    [id number]                    Reading MD:    Measurements  Intervals                                Axis  Rate:       86                           P:          72  OK:         146                          QRS:        101  QRSD:       84                           T:          56  QT:         381  QTc:        456    Interpretive Statements  Sinus rhythm  Multiform ventricular premature complexes  Consider right atrial enlargement  Right axis deviation  Compared to ECG 2024 12:39:15  Right-axis deviation now present  Sinus tachycardia no longer present  ST (T wave) deviation no longer present     EC-ECHOCARDIOGRAM COMPLETE W/O CONT    Collection Time: 24  2:01 PM   Result Value Ref Range    Eject.Frac. MOD BP 57.7     Eject.Frac. MOD 4C 54.5     Eject.Frac. MOD 2C 53.98     Left Ventrical Ejection Fraction 58        Imaging:  EC-ECHOCARDIOGRAM COMPLETE W/O CONT   Final Result      DX-CHEST-PORTABLE (1 VIEW)   Final Result         No acute cardiac or pulmonary abnormality is identified.      NM-CARDIAC STRESS TEST    (Results Pending)           EKG tracings personally reviewed by me sinus rhythm with right axis deviation PVCs    Echocardiogram images  personally reviewed by me show pulm hypertension without significant RV enlargement    All pertinent features of laboratory and imaging reviewed including primary images where applicable      Principal Problem:    Pulmonary hypertension (HCC) (POA: Yes)  Active Problems:    Methamphetamine use (HCC) (Chronic) (POA: Yes)    Marijuana use (POA: Yes)    Type 2 diabetes mellitus (HCC) (POA: Yes)    Hypothyroidism (POA: Yes)    Essential hypertension (POA: Yes)  Resolved Problems:    * No resolved hospital problems. *      Assessment / Plan:  Pulm hypertension likely due to longstanding methamphetamine abuse would encourage him to establish with a pulm hypertension clinic after strict methamphetamine cessation if he is achieve methamphetamine cessation and it is felt worthwhile to pursue right heart catheterization versus reasonable.  Please let me know and I can help arrange    He will be discharged    I personally discussed his case with  Dr Sunil Diaz M.D. and Dr. Mati Figueroa    It is my pleasure to participate in the care of Mr. Taveras.  Please do not hesitate to contact me with questions or concerns.    Jin Chery MD PhD [facility]  Cardiologist [facility]    9/29/2024    Please note that this dictation was created using voice recognition software. There may be errors I did not discover before finalizing the note.

## 2024-09-29 NOTE — CONSULTS
"Pulmonary Consultation    Date of Service  9/29/2024    Referring Physician  Sunil Diaz M.D.    Consulting Physician  Mati Mendoza D.O.    Reason for Consultation  Possible pulmonary hypertension; meth abuse    History of Presenting Illness  From H&P: \"Clarence Taveras is a 69 y.o. male who presented to the ED on 9/28/2024 with complaints of exertional shortness of breath. The patient tells me that he stopped smoking in February, 2024. He says he smoked a pack cigarettes daily for many years. Since he quit smoking, he has had shortness of breath mainly when he bends over or carries heavy objects. He occasionally has shortness of breath with exertion. He denies associated chest pain, dizziness, lightheadedness, diaphoresis nausea or palpitations. The patient denies cold or flulike illnesses, denies history of lung disease. He has never been diagnosed with COPD. He reports very occasional use of marijuana. He says he uses small doses of methamphetamine, last used four days ago. Denies alcohol use. He denies a family history of lung disease, cancer, diabetes or heart disease. \"    Pulmonary consulted due to elevated pressures on ECHO.  Patient on room air and with no edema.   Admits that last meth use was within the last week.     Review of Systems  Review of Systems   Respiratory:  Positive for shortness of breath.        Past Medical History   has a past medical history of Essential hypertension (09/29/2024), Hypothyroidism (09/29/2024), Marijuana use (09/28/2024), Methamphetamine use (HCC) (09/28/2024), and Type 2 diabetes mellitus (HCC) (09/29/2024).    Surgical History   has no past surgical history on file.    Family History  family history is not on file.    Social History   reports that he quit smoking about 7 months ago. His smoking use included cigarettes. He has never used smokeless tobacco. He reports that he does not currently use alcohol. He reports that he does not use " drugs.    Medications  None       Allergies  No Known Allergies    Physical Exam  Temp:  [36.6 °C (97.8 °F)-36.8 °C (98.3 °F)] 36.6 °C (97.8 °F)  Pulse:  [84-99] 96  Resp:  [16-22] 16  BP: (121-172)/() 121/80  SpO2:  [92 %-95 %] 92 %    Physical Exam  Vitals reviewed.   Constitutional:       General: He is not in acute distress.     Appearance: He is not ill-appearing, toxic-appearing or diaphoretic.   Eyes:      General:         Right eye: No discharge.         Left eye: No discharge.      Conjunctiva/sclera: Conjunctivae normal.   Cardiovascular:      Rate and Rhythm: Normal rate.      Pulses: Normal pulses.   Pulmonary:      Effort: Pulmonary effort is normal.      Breath sounds: Normal breath sounds.   Musculoskeletal:      Right lower leg: No edema.      Left lower leg: No edema.   Skin:     General: Skin is warm.      Capillary Refill: Capillary refill takes less than 2 seconds.      Coloration: Skin is not jaundiced.   Neurological:      General: No focal deficit present.      Mental Status: He is alert.   Psychiatric:         Behavior: Behavior normal.           Fluids  Date 09/29/24 0700 - 09/30/24 0659   Shift 3861-9831 7948-8894 8527-5029 24 Hour Total   INTAKE   Shift Total       OUTPUT   Urine 225   225   Shift Total 225   225   Weight (kg) 81.7 81.7 81.7 81.7       Laboratory  Recent Labs     09/28/24  1258 09/29/24  0012   WBC 10.1 10.3   RBC 5.92 5.83   HEMOGLOBIN 20.1* 19.2*   HEMATOCRIT 56.9* 55.4*   MCV 96.1 95.0   MCH 34.0* 32.9   MCHC 35.3 34.7   RDW 45.5 45.3   PLATELETCT 246 238   MPV 9.2 9.4     Recent Labs     09/28/24  1258   SODIUM 140   POTASSIUM 4.9   CHLORIDE 105   CO2 24   GLUCOSE 90   BUN 18   CREATININE 1.39   CALCIUM 10.1              Recent Labs     09/29/24  0012   TRIGLYCERIDE 146   HDL 42   *        Imaging  EC-ECHOCARDIOGRAM COMPLETE W/O CONT   Final Result      DX-CHEST-PORTABLE (1 VIEW)   Final Result         No acute cardiac or pulmonary abnormality is  identified.      NM-CARDIAC STRESS TEST    (Results Pending)       Assessment/Plan  #Meth use  #Possible pulmonary hypertension     Recommend abstinence from meth and ETOH use. Can consider outpatient RHC in 3-6 months if able to stay off of methamphetamines. I did explain to the patient that meth is a common cause of RHF and that the heart can often repair itself if the meth use stops. He expressed an intention to quit. Cardiology was also consulted. I discussed with Dr. Chery and agree with his recommendations.    We will sign off. Please do not hesitate to contact us if we can be of any further assistance. I am most easily reached via [employer] secure messaging application.      Total consult time: 60 minutes which included time spent on chart review, personally reviewing pertinent images and labs, time spent counseling and educating the patient and/or family members, and coordinating care with the healthcare team to include consultants.     Mati Mendoza,   Staff Pulmonologist and Intensivist  [facility]     Please note that this dictation was created using voice recognition software. The accuracy of the dictation is limited to the abilities of the software. I have made every reasonable attempt to correct obvious errors, but I expect that there are errors of grammar and possibly content that I did not discover before finalizing the note.

## 2024-09-29 NOTE — PROGRESS NOTES
SR HR 89-97  Ectopy: Frequent PVCs, [place], couplets  0.19/0.07/0.36 per monitor rooms strip interpretation

## 2024-09-29 NOTE — DISCHARGE SUMMARY
"Discharge Summary    CHIEF COMPLAINT ON ADMISSION  Chief Complaint   Patient presents with    Chest Pain     Patient substernal chest pain that has been happening intermittently since he quit smoking in February. Patient reports he notices the pain mostly on exertion. Patient endorses intermittent SOB as well    Shortness of Breath     Patient reports \"I feel like my lungs cleared up when I stopped smoking, but I get winded much faster\"        Reason for Admission  Chest pain     Admission Date  9/28/2024    CODE STATUS  Full Code    HPI & HOSPITAL COURSE  This is a 69-year-old male with history of heavy tobacco use reportedly quit in February 2024, and ongoing methamphetamine and marijuana use, who presented with exertional shortness of breath with associated chest pressure, with some diaphoresis.  Symptoms have started since he quit smoking.  He had no other complaint such as nausea, vomiting, dizziness, lightheadedness, palpitations, bowel movement changes.  He had no lower extremity edema.  He denies any orthopnea or PND.  He is not following any medical provider.     On evaluation, blood pressures were running high in the 140s to 170s.  EKG showed no dynamic schema changes.  Troponins were negative x 2.  No leukocytosis.  Electrolytes are normal.  Creatinine 1.39 with GFR 55.  Chest x-ray did not show any infiltrates or consolidation.  Further cardiac workup was pursued. Urine drug screen was positive for amphetamines.  Troponins negative x 2.  Urinalysis was clean.  TSH 8.61.  HbA1c was 6.5.  LDL was 116 and triglyceride 146.  Echocardiogram showed EF of 58%, with flattened septum in systole and diastole (D-shaped left ventricle) consistent with RV pressure and volume overload with normal diastolic function, RVSP 75 mmHg.  His pulmonary hypertension was felt to be related to his meth use.  Pulmonology and cardiology were consulted who both recommended meth use cessation and consider outpatient right heart cath " in 3 to 6 months if able to stay off of methamphetamine.  He maintained sinus rhythm with PVCs on telemetry.    To optimize his medical care, he was started on Synthroid for hypothyroidism, along with olmesartan for hypertension.  He was counseled extensively about the diagnosis of diabetes mellitus and formulated plan of care for him to do lifestyle changes first before starting on medications.    He remained hemodynamically stable and afebrile.  He remained not hypoxic and saturating well on room air.  He did not have any signs of decompensated heart failure. I have personally seen and examined the patient on the day of discharge. With his clinical improvement, he was deemed ready to discharge from the hospital as he did not have any further hospitalization needs. Patient felt comfortable going home. The discharge plan was discussed with the patient, with which he was agreeable to.     Therefore, he is discharged in good and stable condition to home with close outpatient follow-up.      Discharge Date  9/29/2024      FOLLOW UP ITEMS POST DISCHARGE  -He is referred to outpatient pulmonology/pulmonary hypertension clinic.  -He is also referred to outpatient PCP to establish care.  -Continue Synthroid.  -Continue olmesartan.  -He will continue on lifestyle changes for his diabetes mellitus.  -Counseled extensively on staying off methamphetamine.  - counseled to seek immediate medical attention, or return to the ED for recurrent or worsening symptoms.      DISCHARGE DIAGNOSES  Principal Problem:    Pulmonary hypertension (HCC) (POA: Yes)  Active Problems:    Methamphetamine use (HCC) (Chronic) (POA: Yes)    Marijuana use (POA: Yes)    Type 2 diabetes mellitus (HCC) (POA: Yes)    Hypothyroidism (POA: Yes)    Essential hypertension (POA: Yes)  Resolved Problems:    * No resolved hospital problems. *      FOLLOW UP  No future appointments.  No follow-up provider specified.    MEDICATIONS ON DISCHARGE     Medication List         START taking these medications        Instructions   levothyroxine 25 MCG Tabs  Start taking on: September 30, 2024  Commonly known as: Synthroid   Take 1 Tablet by mouth every morning on an empty stomach.  Dose: 25 mcg     olmesartan 20 MG Tabs  Start taking on: September 30, 2024  Commonly known as: Benicar   Take 1 Tablet by mouth every day.  Dose: 20 mg              Allergies  No Known Allergies    DIET  Orders Placed This Encounter   Procedures    Diet Order Diet: Consistent CHO (Diabetic)     Standing Status:   Standing     Number of Occurrences:   1     Order Specific Question:   Diet:     Answer:   Consistent CHO (Diabetic) [4]       ACTIVITY  As tolerated.  Weight bearing as tolerated    CONSULTATIONS  Pulmonology  Cardiology    PROCEDURES  As above    LABORATORY  Lab Results   Component Value Date    SODIUM 140 09/28/2024    POTASSIUM 4.9 09/28/2024    CHLORIDE 105 09/28/2024    CO2 24 09/28/2024    GLUCOSE 90 09/28/2024    BUN 18 09/28/2024    CREATININE 1.39 09/28/2024        Lab Results   Component Value Date    WBC 10.3 09/29/2024    HEMOGLOBIN 19.2 (H) 09/29/2024    HEMATOCRIT 55.4 (H) 09/29/2024    PLATELETCT 238 09/29/2024        Total time of the discharge process = 42 minutes.

## 2024-09-29 NOTE — ASSESSMENT & PLAN NOTE
Patient last used four days ago. He says he was small amount of methamphetamine  - UDS   -encouraged cessation

## 2024-09-30 LAB — EKG IMPRESSION: NORMAL

## 2024-09-30 PROCEDURE — 93010 ELECTROCARDIOGRAM REPORT: CPT | Performed by: INTERNAL MEDICINE
